# Patient Record
Sex: MALE | Race: OTHER | URBAN - METROPOLITAN AREA
[De-identification: names, ages, dates, MRNs, and addresses within clinical notes are randomized per-mention and may not be internally consistent; named-entity substitution may affect disease eponyms.]

---

## 2017-05-23 ENCOUNTER — OFFICE VISIT (OUTPATIENT)
Dept: OTOLARYNGOLOGY | Facility: CLINIC | Age: 67
End: 2017-05-23

## 2017-05-23 VITALS
DIASTOLIC BLOOD PRESSURE: 74 MMHG | HEIGHT: 70 IN | WEIGHT: 190 LBS | TEMPERATURE: 98 F | BODY MASS INDEX: 27.2 KG/M2 | SYSTOLIC BLOOD PRESSURE: 120 MMHG

## 2017-05-23 DIAGNOSIS — H60.332 ACUTE SWIMMER'S EAR OF LEFT SIDE: Primary | ICD-10-CM

## 2017-05-23 RX ORDER — CIPROFLOXACIN AND DEXAMETHASONE 3; 1 MG/ML; MG/ML
4 SUSPENSION/ DROPS AURICULAR (OTIC) EVERY 12 HOURS
Qty: 1 BOTTLE | Refills: 0 | Status: SHIPPED | OUTPATIENT
Start: 2017-05-23 | End: 2017-05-30

## 2017-05-24 NOTE — PATIENT INSTRUCTIONS
When to Use Antibiotics   Antibiotics are medicines used to treat infections caused by bacteria. They don’t work for illnesses caused by viruses or an allergic reaction.  In fact, taking antibiotics for reasons other than a bacterial infection can cause p · Most sinus infections (sinusitis). This kind of infection causes sinus pain and swelling, and a runny nose. In most cases, sinusitis goes away on its own, and antibiotics don’t make recovery quicker. · Allergic rhinitis.  This is a set of symptoms caused · Use over-the-counter medicine such as acetaminophen to ease pain or fever, as directed by your healthcare provider.   To treat sinus pain or nasal congestion:   · Put a warm, moist washcloth on your face where you feel sinus pain or pressure.   · Use a na

## 2017-05-24 NOTE — PROGRESS NOTES
Jeremi Cullen is a 78year old male. Patient presents with:  Ear Problem: Patient RTC for Left ear pain    HPI:   He has had some pain involving his left ear for the last few days. There has been no drainage.  This seems to occur more frequently when his Normal Submental. Submandibular. Anterior cervical. Posterior cervical. Supraclavicular.    Eyes Normal Conjunctiva - Right: Normal, Left: Normal. Pupil - Right: Normal, Left: Normal.    Ears Normal Inspection - Right: Normal, Left: Normal. Canal - Left: No

## (undated) NOTE — MR AVS SNAPSHOT
[facility]  [phone]               Thank you for choosing us for your health care visit with Ranjeet Hahn MD.  We are glad to serve you and happy to provide you with this summar The flu usually goes away on its own in a week or so. It can cause fever, body aches, sore throat, and fatigue. · Bronchitis. This is an infection in the lungs most often caused by a virus. You may have coughing, phlegm, body aches, and a low fever.  A com burning pain and urine that’s cloudy or tinted with blood. UTIs are very common. Antibiotics usually help treat these infections. · Some ear infections. In some cases, a healthcare provider may prescribe antibiotics for an ear infection.  You may need a te substitute for professional medical care. Always follow your healthcare professional's instructions.              Allergies as of May 23, 2017     Sulfa Antibiotics Rash                Today's Vital Signs     BP Temp Height Weight BMI    120/74 mmHg 97.7 °F medical emergencies, dial 911. Educational Information     Healthy Diet and Regular Exercise  [employer] for making healthy food choices  -   Enjoy your food, but eat less. Fully enjoy your food when eating.    Don’t eat w